# Patient Record
Sex: FEMALE | Race: WHITE | NOT HISPANIC OR LATINO | URBAN - METROPOLITAN AREA
[De-identification: names, ages, dates, MRNs, and addresses within clinical notes are randomized per-mention and may not be internally consistent; named-entity substitution may affect disease eponyms.]

---

## 2023-11-19 ENCOUNTER — HOSPITAL ENCOUNTER (EMERGENCY)
Facility: OTHER | Age: 23
Discharge: HOME | End: 2023-11-19
Attending: INTERNAL MEDICINE
Payer: COMMERCIAL

## 2023-11-19 VITALS
RESPIRATION RATE: 18 BRPM | TEMPERATURE: 98 F | HEART RATE: 90 BPM | SYSTOLIC BLOOD PRESSURE: 109 MMHG | OXYGEN SATURATION: 100 % | DIASTOLIC BLOOD PRESSURE: 61 MMHG

## 2023-11-19 DIAGNOSIS — F10.920 ALCOHOLIC INTOXICATION WITHOUT COMPLICATION: Primary | ICD-10-CM

## 2023-11-19 PROCEDURE — 99283 EMERGENCY DEPT VISIT LOW MDM: CPT

## 2023-11-19 NOTE — ED PROVIDER NOTES
"Encounter date: 3:50 AM 11/19/2023    Source of History   Patient, Mother, EMS    Chief Complaint   Pt presents with:   Alcohol Intoxication (+n/v after drinking alcohol tonight. Pt received 1 liter NS)      History Of Present Illness   Dayana Anderson is a 23 y.o. female with no significant PMHx who presents to the ED via EMS for altered mental status in the setting of alcohol intoxication. Patient reports she began drinking at 12p yesterday with her friends. Per Mom, states pt came home in an Uber tonight intoxicated with trusted family friends and "completely incoherent," prompting her to activate EMS. She reports concern after EMS noted pinpoint pupils. Pt reports symptoms of alcohol intoxication nausea with 2 bouts of vomiting. She denies drugs use or smoking. Pt did report multiple episodes of nausea and vomiting although this is now resolved. She denies fever, chest pain, shortness of breath, abdominal pain, traumas, injuries, falls, or pain elsewhere. No concern for sexual assault. Story obtained from Mom and EMS.    This is the extent to the patients complaints today here in the emergency department.  Past History   Review of patient's allergies indicates:  No Known Allergies    No current facility-administered medications on file prior to encounter.     No current outpatient medications on file prior to encounter.       As per HPI and below:  No past medical history on file.  No past surgical history on file.    Social History     Socioeconomic History    Marital status: Single       No family history on file.    Physical Exam     Vitals:    11/19/23 0321 11/19/23 0343 11/19/23 0353   BP: 111/83 107/64 109/61   BP Location:  Left arm Left arm   Patient Position:  Lying Lying   Pulse: 80 98 90   Resp: 18     Temp: 98.3 °F (36.8 °C) 97.9 °F (36.6 °C) 97.9 °F (36.6 °C)   TempSrc: Oral Oral Oral   SpO2: 100% 99% 100%     Physical Exam:   Nursing note and vitals reviewed.  Appearance: Well appearing, intoxicated " female in no acute respiratory distress.  Making purposeful movements.  Speaking in full sentences.  Skin: No obvious rashes seen.  Good turgor.  No abrasions.  No ecchymoses.  Eyes: No conjunctival injection. EOMI, PERRL.  Head: NC/AT  Chest: CTAB. No increased work of breathing, bilateral chest rise.  Cardiovascular: Regular rate and rhythm.  Normal equal bilateral radial pulses.  Abdomen: Soft.  Not distended.  Nontender.  No guarding.  No rebound. No Masses  Musculoskeletal: No obvious deformities.   Neck supple, full range of motion, no obvious deformity.   No tenderness to palpation of cervical through lumbar spine.  No step-offs or deformities. Good range of motion all joints.  Neurologic: Moves all extremities.  Normal sensation.  No facial droop.  Normal speech.    Mental Status:  Alert and oriented x 3.  Appropriate, conversant.      Results and Medications    Procedures    Labs Reviewed - No data to display    Imaging Results    None             Medications - No data to display    MDM, Impression and Plan   Differential diagnosis:  -EtOH intoxication  -Unlikely metabolic syndrome    Initial Assessment & ED Management:    Urgent evaluation of 23 y.o. female with history as above who presents the ED with alcohol intoxication.  Mother noted concern for unintentional fentanyl being slipped in her drank based off a paramedics pointing that the patient had pinpoint pupils.  Vitals remained stable.  She has no signs of airway compromise.  Her neurologic exam is overall benign.  She is intoxicated.  She and her mother feel comfortable going home.  We discussed side effects of opioids.  I offered to send the patient home with Narcan.  Mother states that she is a principal of the school and has access to Narcan.  Patient is safe to go home with mother.  Care plan addressed with patient and all those present. All questions answered.  Strict return precautions discussed.  Patient was instructed on the correct  follow-up time and route.  They voiced verbal understanding and agreement  with the plan and were deemed stable for discharge.     Medical Decision Making  Amount and/or Complexity of Data Reviewed  Independent Historian: parent and EMS  External Data Reviewed: notes.           Please see ED course for discussion of workup and dispo if not listed above.          Final diagnoses:  [F10.920] Alcoholic intoxication without complication (Primary)        ED Disposition Condition    Discharge Stable          ED Prescriptions    None       Follow-up Information       Follow up With Specialties Details Why Contact Info    Nayla Soliz  Schedule an appointment as soon as possible for a visit in 2 days or the primary care doctor of your choice [address] [facility] [address]  [phone]      [facility] - Emergency Dept Emergency Medicine  If symptoms worsen [address] [facility] [address] [phone]                   Scribe Attestation:   Scribe #1: I performed the above scribed service and the documentation accurately describes the services I performed. I attest to the accuracy of the note.    Physician Attestation for Scribe: I, Anahi Taylor, reviewed documentation as scribed in my presence, which is both accurate and complete.        MD Brandon Cuevas Heyward B, MD  11/19/23 7313

## 2023-11-19 NOTE — DISCHARGE INSTRUCTIONS
Diagnosis:   1. Alcoholic intoxication without complication        Tests you had showed:   Labs Reviewed - No data to display   No orders to display       Treatments you received were:   Medications - No data to display    Home Care Instructions:  - Medications: Continue taking your home medications as prescribed    Follow-Up Plan:  - Follow-up with: Primary care doctor within 1-2  days  - Additional testing and/or evaluation will be directed by your primary doctor    Return to the Emergency Department for symptoms including but not limited to: worsening symptoms, severe back pain, shortness of breath or chest pain, vomiting with inability to hold down fluids, blood in vomit or poop, fevers greater than 100.4°F, passing out/fainting/unconsciousness, or other concerning symptoms.     No future appointments.

## 2023-11-19 NOTE — ED TRIAGE NOTES
Patient presents to ED via EMS, EMS reports + ETOH, respirations of 6, and pinpoint pupils, 1 liter NS. Patient currently denies chest pain, abdominal pain, and SOB. Respirations even and unlabored, speaking in complete sentences, VSS, safety measures in place, will continue to monitor.